# Patient Record
Sex: FEMALE | Race: BLACK OR AFRICAN AMERICAN | Employment: FULL TIME | URBAN - NONMETROPOLITAN AREA
[De-identification: names, ages, dates, MRNs, and addresses within clinical notes are randomized per-mention and may not be internally consistent; named-entity substitution may affect disease eponyms.]

---

## 2022-11-05 ENCOUNTER — HOSPITAL ENCOUNTER (EMERGENCY)
Age: 47
Discharge: HOME | End: 2022-11-05
Attending: EMERGENCY MEDICINE
Payer: COMMERCIAL

## 2022-11-05 VITALS
TEMPERATURE: 98.4 F | SYSTOLIC BLOOD PRESSURE: 143 MMHG | OXYGEN SATURATION: 99 % | HEART RATE: 72 BPM | DIASTOLIC BLOOD PRESSURE: 95 MMHG | RESPIRATION RATE: 18 BRPM

## 2022-11-05 DIAGNOSIS — T16.2XXA ACUTE FOREIGN BODY OF LEFT EAR, INITIAL ENCOUNTER: Primary | ICD-10-CM

## 2022-11-05 PROCEDURE — 99283 EMERGENCY DEPT VISIT LOW MDM: CPT

## 2022-11-05 RX ORDER — CIPROFLOXACIN AND DEXAMETHASONE 3; 1 MG/ML; MG/ML
4 SUSPENSION/ DROPS AURICULAR (OTIC) 2 TIMES DAILY
Qty: 7.5 ML | Refills: 0 | Status: SHIPPED | OUTPATIENT
Start: 2022-11-05 | End: 2022-11-12

## 2022-11-05 ASSESSMENT — PAIN DESCRIPTION - DESCRIPTORS: DESCRIPTORS: ACHING

## 2022-11-05 ASSESSMENT — PAIN SCALES - GENERAL: PAINLEVEL_OUTOF10: 8

## 2022-11-05 ASSESSMENT — PAIN DESCRIPTION - ORIENTATION: ORIENTATION: LEFT

## 2022-11-05 ASSESSMENT — PAIN - FUNCTIONAL ASSESSMENT: PAIN_FUNCTIONAL_ASSESSMENT: 0-10

## 2022-11-05 ASSESSMENT — PAIN DESCRIPTION - LOCATION: LOCATION: EAR

## 2022-11-05 NOTE — ED TRIAGE NOTES
Patient presents to ED with chief complaint of foreign body in ear. Patient states that the cotton part of a q-tip broke off in her ear. Patient resting in bed. Respirations easy and unlabored. No distress noted. Call light within reach.

## 2022-11-05 NOTE — ED PROVIDER NOTES
[address] ENCOUNTER      PATIENT NAME: Audra Anthony  MRN: [id number]  : 1975  COOPER: 2022  PROVIDER: Tushar Powell MD      CHIEF COMPLAINT       Chief Complaint   Patient presents with    Foreign Body in Ear       Patient is seen and evaluated in a timely fashion. Nurses Notes are reviewed and I agree except as noted in the HPI. HISTORY OF PRESENT ILLNESS    Audra Anthony is a 41 y.o. female who presents to Emergency Department with Foreign Body in Ear     Patient presents to ED for evaluation of a left ear foreign body. Patient was cleaning her ears this morning, a cotton tip of the Q-tip was stuck in left ear. She tried to take it out however was unsuccessful. She has mild pain from the left ear. No discharge or bleeding. This HPI was provided by patient. REVIEW OF SYSTEMS   Ten-point review of systems is negative except those documented in above HPI including constitutional, HEENT, respiratory, cardiovascular, gastrointestinal, genitourinary, musculoskeletal, skin, neurological, hematological and behavioral.      PAST MEDICAL HISTORY    has no past medical history on file. SURGICAL HISTORY      has no past surgical history on file. CURRENT MEDICATIONS       Previous Medications    No medications on file       ALLERGIES     has No Known Allergies. FAMILY HISTORY     has no family status information on file. family history is not on file. SOCIAL HISTORY          PHYSICAL EXAM      oral temperature is 98.4 °F (36.9 °C). Her blood pressure is 143/95 (abnormal) and her pulse is 72. Her respiration is 18 and oxygen saturation is 99%. Physical Exam  Vitals and nursing note reviewed. Constitutional:       Appearance: She is well-developed. She is not diaphoretic. HENT:      Head: Normocephalic and atraumatic. Ears:      Comments: Left canal FB (cotton)      Nose: Nose normal.   Eyes:      General: No scleral icterus. Right eye: No discharge. Left eye: No discharge. Conjunctiva/sclera: Conjunctivae normal.      Pupils: Pupils are equal, round, and reactive to light. Neck:      Vascular: No JVD. Trachea: No tracheal deviation. Cardiovascular:      Rate and Rhythm: Normal rate and regular rhythm. Heart sounds: Normal heart sounds. No murmur heard. No friction rub. No gallop. Pulmonary:      Effort: Pulmonary effort is normal. No respiratory distress. Breath sounds: Normal breath sounds. No stridor. No wheezing or rales. Chest:      Chest wall: No tenderness. Abdominal:      General: Bowel sounds are normal. There is no distension. Palpations: Abdomen is soft. There is no mass. Tenderness: There is no abdominal tenderness. There is no guarding or rebound. Hernia: No hernia is present. Musculoskeletal:         General: No tenderness or deformity. Cervical back: Normal range of motion and neck supple. Lymphadenopathy:      Cervical: No cervical adenopathy. Skin:     General: Skin is warm and dry. Capillary Refill: Capillary refill takes less than 2 seconds. Coloration: Skin is not pale. Findings: No erythema or rash. Neurological:      Mental Status: She is alert and oriented to person, place, and time. Cranial Nerves: No cranial nerve deficit. Sensory: No sensory deficit. Motor: No abnormal muscle tone. Coordination: Coordination normal.      Deep Tendon Reflexes: Reflexes normal.   Psychiatric:         Behavior: Behavior normal.         Thought Content: Thought content normal.         Judgment: Judgment normal.     ANCILLARY TEST RESULTS   EKG: Interpreted by me  None    LAB RESULTS:  No results found for this visit on 11/05/22. RADIOLOGY REPORTS  No orders to display       [address] ([facility]) AND ED COURSE   Patient is seen and evaluated at 2:10 AM EDT.    DDx: ear FB    Left ear FB (cotton) is taken out without complication. Discharged with Ciprodex. [facility] family medicine residency clinic follow-up as needed. Consult  None    Procedures  None    Medications - No data to display    Vitals:    11/05/22 0139   BP: (!) 143/95   Pulse: 72   Resp: 18   Temp: 98.4 °F (36.9 °C)   TempSrc: Oral   SpO2: 99%       FINAL IMPRESSION AND DISPOSITION      1. Acute foreign body of left ear, initial encounter        DISPOSITION Decision To Discharge 11/05/2022 02:08:07 AM      PATIENT REFERRED TO:  [address] [place] [address],[address]  [facility]. [address] [place] [address]    As needed    DISCHARGE MEDICATIONS:  New Prescriptions    CIPROFLOXACIN-DEXAMETHASONE (CIPRODEX) 0.3-0.1 % OTIC SUSPENSION    Place 4 drops into the left ear 2 times daily for 7 days     (Please note that portions of this note were completed with a voice recognition program.  Efforts were made to edit the dictations but occasionally words aremis-transcribed.)  MD Griselda Soto MD  11/05/22 0425